# Patient Record
Sex: MALE | Race: WHITE
[De-identification: names, ages, dates, MRNs, and addresses within clinical notes are randomized per-mention and may not be internally consistent; named-entity substitution may affect disease eponyms.]

---

## 2019-03-14 ENCOUNTER — HOSPITAL ENCOUNTER (EMERGENCY)
Dept: HOSPITAL 9 - MADERS | Age: 49
Discharge: HOME | End: 2019-03-14
Payer: COMMERCIAL

## 2019-03-14 DIAGNOSIS — F17.200: ICD-10-CM

## 2019-03-14 DIAGNOSIS — Z79.899: ICD-10-CM

## 2019-03-14 DIAGNOSIS — R35.0: ICD-10-CM

## 2019-03-14 DIAGNOSIS — I10: ICD-10-CM

## 2019-03-14 DIAGNOSIS — J06.9: Primary | ICD-10-CM

## 2019-03-14 PROCEDURE — 36415 COLL VENOUS BLD VENIPUNCTURE: CPT

## 2019-03-14 PROCEDURE — 99283 EMERGENCY DEPT VISIT LOW MDM: CPT

## 2019-03-14 PROCEDURE — 87081 CULTURE SCREEN ONLY: CPT

## 2019-03-14 PROCEDURE — 87430 STREP A AG IA: CPT

## 2019-03-14 PROCEDURE — 87804 INFLUENZA ASSAY W/OPTIC: CPT

## 2020-05-27 ENCOUNTER — HOSPITAL ENCOUNTER (EMERGENCY)
Dept: HOSPITAL 9 - MADERS | Age: 50
Discharge: HOME | End: 2020-05-27
Payer: COMMERCIAL

## 2020-05-27 DIAGNOSIS — K50.90: ICD-10-CM

## 2020-05-27 DIAGNOSIS — I10: ICD-10-CM

## 2020-05-27 DIAGNOSIS — G43.909: ICD-10-CM

## 2020-05-27 DIAGNOSIS — Z79.899: ICD-10-CM

## 2020-05-27 DIAGNOSIS — Z87.891: ICD-10-CM

## 2020-05-27 DIAGNOSIS — R07.9: Primary | ICD-10-CM

## 2020-05-27 LAB
ALBUMIN SERPL BCG-MCNC: 4.4 G/DL (ref 3.5–5)
ALP SERPL-CCNC: 48 U/L (ref 40–110)
ALT SERPL W P-5'-P-CCNC: 16 U/L (ref 8–55)
ANION GAP SERPL CALC-SCNC: 13 MMOL/L (ref 10–20)
AST SERPL-CCNC: 17 U/L (ref 5–34)
BASOPHILS # BLD AUTO: 0.1 THOU/UL (ref 0–0.2)
BASOPHILS NFR BLD AUTO: 1.3 % (ref 0–1)
BILIRUB SERPL-MCNC: 0.4 MG/DL (ref 0.2–1.2)
BUN SERPL-MCNC: 21 MG/DL (ref 8.9–20.6)
CALCIUM SERPL-MCNC: 9.4 MG/DL (ref 7.8–10.44)
CHLORIDE SERPL-SCNC: 105 MMOL/L (ref 98–107)
CK SERPL-CCNC: 97 U/L (ref 30–200)
CO2 SERPL-SCNC: 28 MMOL/L (ref 22–29)
CREAT CL PREDICTED SERPL C-G-VRATE: 0 ML/MIN (ref 70–130)
EOSINOPHIL # BLD AUTO: 0 THOU/UL (ref 0–0.7)
EOSINOPHIL NFR BLD AUTO: 0.3 % (ref 0–10)
GLOBULIN SER CALC-MCNC: 2.8 G/DL (ref 2.4–3.5)
GLUCOSE SERPL-MCNC: 69 MG/DL (ref 70–105)
HGB BLD-MCNC: 14.1 G/DL (ref 14–18)
LYMPHOCYTES # BLD AUTO: 2.1 THOU/UL (ref 1.2–3.4)
LYMPHOCYTES NFR BLD AUTO: 27.4 % (ref 21–51)
MCH RBC QN AUTO: 29.1 PG (ref 27–31)
MCV RBC AUTO: 92 FL (ref 78–98)
MONOCYTES # BLD AUTO: 0.5 THOU/UL (ref 0.11–0.59)
MONOCYTES NFR BLD AUTO: 7 % (ref 0–10)
NEUTROPHILS # BLD AUTO: 4.8 THOU/UL (ref 1.4–6.5)
NEUTROPHILS NFR BLD AUTO: 64 % (ref 42–75)
PLATELET # BLD AUTO: 323 THOU/UL (ref 130–400)
POTASSIUM SERPL-SCNC: 3.9 MMOL/L (ref 3.5–5.1)
RBC # BLD AUTO: 4.83 MILL/UL (ref 4.7–6.1)
SODIUM SERPL-SCNC: 142 MMOL/L (ref 136–145)
WBC # BLD AUTO: 7.5 THOU/UL (ref 4.8–10.8)

## 2020-05-27 PROCEDURE — 93005 ELECTROCARDIOGRAM TRACING: CPT

## 2020-05-27 PROCEDURE — 96375 TX/PRO/DX INJ NEW DRUG ADDON: CPT

## 2020-05-27 PROCEDURE — 96361 HYDRATE IV INFUSION ADD-ON: CPT

## 2020-05-27 PROCEDURE — 71045 X-RAY EXAM CHEST 1 VIEW: CPT

## 2020-05-27 PROCEDURE — 85025 COMPLETE CBC W/AUTO DIFF WBC: CPT

## 2020-05-27 PROCEDURE — 96374 THER/PROPH/DIAG INJ IV PUSH: CPT

## 2020-05-27 PROCEDURE — 82550 ASSAY OF CK (CPK): CPT

## 2020-05-27 PROCEDURE — 83690 ASSAY OF LIPASE: CPT

## 2020-05-27 PROCEDURE — 85379 FIBRIN DEGRADATION QUANT: CPT

## 2020-05-27 PROCEDURE — 80053 COMPREHEN METABOLIC PANEL: CPT

## 2020-05-27 PROCEDURE — 84484 ASSAY OF TROPONIN QUANT: CPT

## 2020-05-27 NOTE — RAD
PORTABLE CHEST ONE VIEW:

5/27/20 at 4:30 p.m.

 

HISTORY: 

Chest pain. 

 

COMPARISON: 

9/13/17.

 

FINDINGS: 

The heart size is normal. The lungs are expanded without focal areas of consolidation, pneumothoraces
, or pleural effusions. 

 

IMPRESSION: 

No radiographic evidence of acute cardiopulmonary process. 

 

POS: [facility]